# Patient Record
Sex: FEMALE | URBAN - METROPOLITAN AREA
[De-identification: names, ages, dates, MRNs, and addresses within clinical notes are randomized per-mention and may not be internally consistent; named-entity substitution may affect disease eponyms.]

---

## 2022-02-10 ENCOUNTER — APPOINTMENT (RX ONLY)
Dept: URBAN - METROPOLITAN AREA CLINIC 151 | Facility: CLINIC | Age: 37
Setting detail: DERMATOLOGY
End: 2022-02-10

## 2022-02-10 DIAGNOSIS — L63.8 OTHER ALOPECIA AREATA: ICD-10-CM

## 2022-02-10 PROCEDURE — ? DIAGNOSIS COMMENT

## 2022-02-10 PROCEDURE — 99203 OFFICE O/P NEW LOW 30 MIN: CPT

## 2022-02-10 PROCEDURE — ? PRESCRIPTION MEDICATION MANAGEMENT

## 2022-02-10 PROCEDURE — ? COUNSELING

## 2022-02-10 PROCEDURE — ? SEPARATE AND IDENTIFIABLE DOCUMENTATION

## 2022-02-10 ASSESSMENT — LOCATION ZONE DERM: LOCATION ZONE: SCALP

## 2022-02-10 ASSESSMENT — LOCATION DETAILED DESCRIPTION DERM: LOCATION DETAILED: RIGHT OCCIPITAL SCALP

## 2022-02-10 ASSESSMENT — LOCATION SIMPLE DESCRIPTION DERM: LOCATION SIMPLE: POSTERIOR SCALP

## 2022-02-10 NOTE — HPI: HAIR LOSS
How Did The Hair Loss Occur?: gradual in onset
How Severe Is Your Hair Loss?: mild
Additional History: Pt notices shedding to her occipital scalp when she shampoos, however, she does not notice hair loss on a regular basis otherwise. Pt’s mother and hairstylist initially noticed the hair loss. Pt notes she experienced lots of shedding when daughter was born 7 years ago.

## 2022-02-10 NOTE — PROCEDURE: DIAGNOSIS COMMENT
Render Risk Assessment In Note?: no
Comment: Favor regrowth from alopecia areata, counseled pt on the nature and etiology of alopecia areata today, disc’d that pt’s hair is in the stage where she is experiencing new hair growth, rec’d not doing anything to tx the condition right now, advised pt to ask her hairdresser if her hair looks man when she is due for her next haircut. Instructed pt to call if the hair loss worsens.
Detail Level: Simple

## 2022-02-10 NOTE — PROCEDURE: COUNSELING
Patient Specific Counseling (Will Not Stick From Patient To Patient): - Disc’d pt’s hair is in the stage where she is experiencing new hair growth\\n- Rec’d pt not tx with anything right now\\n- Pt to ask hairdresser when she is due for her next haircut in the spring if her hair looks man\\n- Disc’d injections are not needed
Detail Level: Simple